# Patient Record
Sex: FEMALE | Race: WHITE | URBAN - METROPOLITAN AREA
[De-identification: names, ages, dates, MRNs, and addresses within clinical notes are randomized per-mention and may not be internally consistent; named-entity substitution may affect disease eponyms.]

---

## 2021-06-29 ENCOUNTER — TELEPHONE (OUTPATIENT)
Dept: NEUROLOGY | Facility: CLINIC | Age: 76
End: 2021-06-29

## 2021-06-29 ENCOUNTER — OFFICE VISIT (OUTPATIENT)
Dept: NEUROLOGY | Facility: CLINIC | Age: 76
End: 2021-06-29
Payer: MEDICARE

## 2021-06-29 VITALS — WEIGHT: 165 LBS | HEIGHT: 57 IN | BODY MASS INDEX: 35.6 KG/M2

## 2021-06-29 DIAGNOSIS — R42 VERTIGO: Primary | ICD-10-CM

## 2021-06-29 PROCEDURE — 99203 OFFICE O/P NEW LOW 30 MIN: CPT | Performed by: OTHER

## 2021-06-29 RX ORDER — ERGOCALCIFEROL (VITAMIN D2) 1250 MCG
50000 CAPSULE ORAL
COMMUNITY
Start: 2020-06-22

## 2021-06-29 RX ORDER — GABAPENTIN 300 MG/1
CAPSULE ORAL
COMMUNITY
Start: 2020-07-21

## 2021-06-29 RX ORDER — MECLIZINE HYDROCHLORIDE 25 MG/1
25 TABLET ORAL
COMMUNITY
Start: 2019-06-05

## 2021-06-29 RX ORDER — ESCITALOPRAM OXALATE 20 MG/1
TABLET ORAL
COMMUNITY
Start: 2020-07-21

## 2021-06-29 RX ORDER — CILOSTAZOL 50 MG/1
TABLET ORAL
COMMUNITY
Start: 2020-07-21

## 2021-06-29 RX ORDER — MELOXICAM 7.5 MG/1
7.5 TABLET ORAL DAILY
COMMUNITY
Start: 2021-06-15

## 2021-06-29 RX ORDER — LOVASTATIN 40 MG/1
TABLET ORAL
COMMUNITY
Start: 2020-10-12

## 2021-06-29 RX ORDER — INSULIN LISPRO 100 [IU]/ML
INJECTION, SOLUTION INTRAVENOUS; SUBCUTANEOUS
COMMUNITY
Start: 2021-04-13

## 2021-06-29 RX ORDER — BLOOD SUGAR DIAGNOSTIC
STRIP MISCELLANEOUS 3 TIMES DAILY
COMMUNITY
Start: 2021-05-06

## 2021-06-29 RX ORDER — LISINOPRIL 2.5 MG/1
TABLET ORAL
COMMUNITY
Start: 2020-07-21

## 2021-06-29 NOTE — TELEPHONE ENCOUNTER
Per Medicare Guidelines -no authorization is required for radiology    Status: Approved-Covered Benefit    Attempt to contact patient to inform her she can proceed with scheduling Brain MR/IAC w+wo CPT [id number] answer (another name was on voicemail-no mess

## 2021-06-29 NOTE — PATIENT INSTRUCTIONS
-Physical therapy referral for vestibular therapy  -We have ordered MRI brain for investigation into your vertigo  -ENT consult   -Follow up in 3 months or sooner if needed.     -If you develop sudden onset loss of vision, double vision, room spinning/world

## 2021-06-29 NOTE — PROGRESS NOTES
[facility]  [address]  [phone]              Date: June 29, 2021  Patient Name: Alexander Alicea   MRN: [id number]    Reason for Evaluation: Dizziness    HPI:     Alexander Alicea is a 68year old rig lisinopril 2.5 MG Oral Tab, TAKE 1 TABLET BY MOUTH  DAILY, Disp: , Rfl:   Meclizine HCl 25 MG Oral Tab, Take 25 mg by mouth., Disp: , Rfl:   metFORMIN HCl 1000 MG Oral Tab, TAKE 1 TABLET BY MOUTH TWO  TIMES DAILY WITH MEALS, Disp: , Rfl:   Meloxicam 7.5 MG normal strength of trapezius and SCM muscles bilaterally. CN XII: tongue protrudes in the midline, no atrophy or fasciculations.     Motor Exam:   Muscle Bulk: No atrophy or fasciculations   Muscle Tone: normal tone in upper and lower extremities   Involun own ability to compensate for vertigo. Potential secondary causes include hyponatremia and gabapentin use. However, patient's sodium levels were normal in June 2020 and she has had symptoms x past 2 to 3 years.     PLAN:   –MRI brain/IACs with and witho

## 2022-06-16 ENCOUNTER — HOSPITAL ENCOUNTER (OUTPATIENT)
Dept: MRI IMAGING | Age: 77
Discharge: HOME | End: 2022-06-16
Attending: Other
Payer: MEDICARE

## 2022-06-16 DIAGNOSIS — R42 VERTIGO: ICD-10-CM

## 2022-06-16 PROCEDURE — 70553 MRI BRAIN STEM W/O & W/DYE: CPT | Performed by: OTHER

## 2022-06-16 PROCEDURE — A9575 INJ GADOTERATE MEGLUMI 0.1ML: HCPCS | Performed by: OTHER

## 2022-06-21 ENCOUNTER — TELEPHONE (OUTPATIENT)
Dept: NEUROLOGY | Facility: CLINIC | Age: 77
End: 2022-06-21

## 2022-06-27 ENCOUNTER — OFFICE VISIT (OUTPATIENT)
Dept: OTOLARYNGOLOGY | Facility: CLINIC | Age: 77
End: 2022-06-27
Payer: MEDICARE

## 2022-06-27 VITALS — WEIGHT: 174 LBS | BODY MASS INDEX: 37.54 KG/M2 | HEIGHT: 57 IN

## 2022-06-27 DIAGNOSIS — R42 DYSEQUILIBRIUM: ICD-10-CM

## 2022-06-27 DIAGNOSIS — H81.12 BENIGN PAROXYSMAL POSITIONAL VERTIGO OF LEFT EAR: Primary | ICD-10-CM

## 2022-06-27 PROCEDURE — 99203 OFFICE O/P NEW LOW 30 MIN: CPT | Performed by: SPECIALIST

## 2022-06-27 NOTE — PATIENT INSTRUCTIONS
You had a left benign positional vertigo  You had a good but not complete response to the canal with repositioning maneuvers. Home exercises were demonstrated and a handout was given. Do the exercises until you are no longer dizzy. Take meclizine as needed for the dizziness. Follow-up with any additional questions or problems. Do not lie flat for 2 days around the left ear for 1 week.